# Patient Record
Sex: FEMALE | Employment: OTHER | URBAN - METROPOLITAN AREA
[De-identification: names, ages, dates, MRNs, and addresses within clinical notes are randomized per-mention and may not be internally consistent; named-entity substitution may affect disease eponyms.]

---

## 2019-03-07 ENCOUNTER — HOSPITAL ENCOUNTER (OUTPATIENT)
Age: 69
Setting detail: SPECIMEN
Discharge: HOME | End: 2019-03-07
Payer: MEDICARE

## 2019-03-07 LAB
ALBUMIN SERPL-MCNC: 4.5 GM/DL (ref 3.4–5)
ALP BLD-CCNC: 80 IU/L (ref 40–129)
ALT SERPL-CCNC: 20 U/L (ref 10–40)
ANION GAP SERPL CALCULATED.3IONS-SCNC: 13 MMOL/L (ref 4–16)
AST SERPL-CCNC: 19 IU/L (ref 15–37)
BILIRUB SERPL-MCNC: 0.4 MG/DL (ref 0–1)
BUN BLDV-MCNC: 16 MG/DL (ref 6–23)
CALCIUM SERPL-MCNC: 10 MG/DL (ref 8.3–10.6)
CHLORIDE BLD-SCNC: 98 MMOL/L (ref 99–110)
CO2: 30 MMOL/L (ref 21–32)
CREAT SERPL-MCNC: 0.7 MG/DL (ref 0.6–1.1)
FERRITIN: 35 NG/ML (ref 15–150)
FOLATE: >20 NG/ML (ref 3.1–17.5)
GFR AFRICAN AMERICAN: >60 ML/MIN/1.73M2
GFR NON-AFRICAN AMERICAN: >60 ML/MIN/1.73M2
GLUCOSE BLD-MCNC: 161 MG/DL (ref 70–99)
IRON: 80 UG/DL (ref 37–145)
PCT TRANSFERRIN: 21 % (ref 10–44)
POTASSIUM SERPL-SCNC: 4.5 MMOL/L (ref 3.5–5.1)
SODIUM BLD-SCNC: 141 MMOL/L (ref 135–145)
TOTAL IRON BINDING CAPACITY: 375 UG/DL (ref 250–450)
TOTAL PROTEIN: 6.8 GM/DL (ref 6.4–8.2)
UNSATURATED IRON BINDING CAPACITY: 295 UG/DL (ref 110–370)
VITAMIN B-12: 633.6 PG/ML (ref 211–911)

## 2019-03-07 PROCEDURE — 82728 ASSAY OF FERRITIN: CPT

## 2019-03-07 PROCEDURE — 83540 ASSAY OF IRON: CPT

## 2019-03-07 PROCEDURE — 80053 COMPREHEN METABOLIC PANEL: CPT

## 2019-03-07 PROCEDURE — 82607 VITAMIN B-12: CPT

## 2019-03-07 PROCEDURE — 83550 IRON BINDING TEST: CPT

## 2019-03-07 PROCEDURE — 82746 ASSAY OF FOLIC ACID SERUM: CPT

## 2019-08-01 ENCOUNTER — HOSPITAL ENCOUNTER (OUTPATIENT)
Age: 69
Setting detail: SPECIMEN
Discharge: HOME | End: 2019-08-01
Payer: MEDICARE

## 2019-08-01 LAB
ALBUMIN SERPL-MCNC: 4.3 GM/DL (ref 3.4–5)
ALP BLD-CCNC: 65 IU/L (ref 40–129)
ALT SERPL-CCNC: 18 U/L (ref 10–40)
ANION GAP SERPL CALCULATED.3IONS-SCNC: 15 MMOL/L (ref 4–16)
AST SERPL-CCNC: 14 IU/L (ref 15–37)
BILIRUB SERPL-MCNC: 0.2 MG/DL (ref 0–1)
BUN BLDV-MCNC: 18 MG/DL (ref 6–23)
CALCIUM SERPL-MCNC: 10 MG/DL (ref 8.3–10.6)
CHLORIDE BLD-SCNC: 99 MMOL/L (ref 99–110)
CO2: 25 MMOL/L (ref 21–32)
CREAT SERPL-MCNC: 0.6 MG/DL (ref 0.6–1.1)
FERRITIN: 30 NG/ML (ref 15–150)
GFR AFRICAN AMERICAN: >60 ML/MIN/1.73M2
GFR NON-AFRICAN AMERICAN: >60 ML/MIN/1.73M2
GLUCOSE BLD-MCNC: 171 MG/DL (ref 70–99)
IRON: 50 UG/DL (ref 37–145)
PCT TRANSFERRIN: 13 % (ref 10–44)
POTASSIUM SERPL-SCNC: 4.6 MMOL/L (ref 3.5–5.1)
SODIUM BLD-SCNC: 139 MMOL/L (ref 135–145)
TOTAL IRON BINDING CAPACITY: 393 UG/DL (ref 250–450)
TOTAL PROTEIN: 6.8 GM/DL (ref 6.4–8.2)
UNSATURATED IRON BINDING CAPACITY: 343 UG/DL (ref 110–370)

## 2019-08-01 PROCEDURE — 80053 COMPREHEN METABOLIC PANEL: CPT

## 2019-08-01 PROCEDURE — 83550 IRON BINDING TEST: CPT

## 2019-08-01 PROCEDURE — 83540 ASSAY OF IRON: CPT

## 2019-08-01 PROCEDURE — 82728 ASSAY OF FERRITIN: CPT

## 2020-02-10 ENCOUNTER — HOSPITAL ENCOUNTER (OUTPATIENT)
Dept: INFUSION THERAPY | Age: 70
Discharge: HOME | End: 2020-02-10
Payer: MEDICARE

## 2020-02-10 LAB
BACTERIA: NEGATIVE /HPF
BILIRUBIN URINE: NEGATIVE MG/DL
BLOOD, URINE: NEGATIVE
CALCIUM OXALATE CRYSTALS: ABNORMAL /HPF
CLARITY: ABNORMAL
COLOR: YELLOW
GLUCOSE, URINE: >500 MG/DL
HYALINE CASTS: 0 /LPF
KETONES, URINE: ABNORMAL MG/DL
LEUKOCYTE ESTERASE, URINE: NEGATIVE
MUCUS: ABNORMAL HPF
NITRITE URINE, QUANTITATIVE: NEGATIVE
PH, URINE: 5 (ref 5–8)
PROTEIN UA: NEGATIVE MG/DL
RBC URINE: 1 /HPF (ref 0–6)
SPECIFIC GRAVITY UA: 1.02 (ref 1–1.03)
SQUAMOUS EPITHELIAL: <1 /HPF
TRICHOMONAS: ABNORMAL /HPF
UROBILINOGEN, URINE: NORMAL MG/DL (ref 0.2–1)
WBC UA: <1 /HPF (ref 0–5)

## 2020-02-10 PROCEDURE — 81001 URINALYSIS AUTO W/SCOPE: CPT

## 2020-02-25 ENCOUNTER — TELEPHONE (OUTPATIENT)
Dept: INFUSION THERAPY | Age: 70
End: 2020-02-25

## 2020-02-27 ENCOUNTER — HOSPITAL ENCOUNTER (OUTPATIENT)
Dept: INFUSION THERAPY | Age: 70
Setting detail: INFUSION SERIES
Discharge: HOME | End: 2020-02-27
Payer: MEDICARE

## 2020-02-27 VITALS
HEART RATE: 83 BPM | RESPIRATION RATE: 16 BRPM | DIASTOLIC BLOOD PRESSURE: 69 MMHG | OXYGEN SATURATION: 95 % | TEMPERATURE: 97.9 F | SYSTOLIC BLOOD PRESSURE: 132 MMHG

## 2020-02-27 PROCEDURE — 2580000003 HC RX 258: Performed by: INTERNAL MEDICINE

## 2020-02-27 PROCEDURE — 96365 THER/PROPH/DIAG IV INF INIT: CPT

## 2020-02-27 PROCEDURE — 6360000002 HC RX W HCPCS: Performed by: INTERNAL MEDICINE

## 2020-02-27 PROCEDURE — 99211 OFF/OP EST MAY X REQ PHY/QHP: CPT

## 2020-02-27 RX ORDER — FERROUS SULFATE 325(65) MG
325 TABLET ORAL
COMMUNITY

## 2020-02-27 RX ORDER — ASCORBIC ACID 500 MG
500 TABLET ORAL DAILY
COMMUNITY

## 2020-02-27 RX ORDER — METFORMIN HYDROCHLORIDE 500 MG/1
500 TABLET, EXTENDED RELEASE ORAL
COMMUNITY

## 2020-02-27 RX ORDER — ATORVASTATIN CALCIUM 10 MG/1
10 TABLET, FILM COATED ORAL NIGHTLY
COMMUNITY

## 2020-02-27 RX ORDER — 0.9 % SODIUM CHLORIDE 0.9 %
10 VIAL (ML) INJECTION PRN
Status: DISCONTINUED | OUTPATIENT
Start: 2020-02-27 | End: 2020-02-28 | Stop reason: HOSPADM

## 2020-02-27 RX ORDER — ACETAMINOPHEN 325 MG/1
325 TABLET ORAL NIGHTLY
COMMUNITY

## 2020-02-27 RX ORDER — ESCITALOPRAM OXALATE 10 MG/1
10 TABLET ORAL EVERY 12 HOURS
COMMUNITY

## 2020-02-27 RX ADMIN — FERRIC CARBOXYMALTOSE INJECTION 750 MG: 50 INJECTION, SOLUTION INTRAVENOUS at 13:45

## 2020-03-06 ENCOUNTER — HOSPITAL ENCOUNTER (OUTPATIENT)
Dept: INFUSION THERAPY | Age: 70
Setting detail: INFUSION SERIES
Discharge: HOME | End: 2020-03-06
Payer: MEDICARE

## 2020-03-06 VITALS
OXYGEN SATURATION: 94 % | TEMPERATURE: 98.2 F | HEART RATE: 76 BPM | RESPIRATION RATE: 16 BRPM | DIASTOLIC BLOOD PRESSURE: 60 MMHG | SYSTOLIC BLOOD PRESSURE: 115 MMHG

## 2020-03-06 PROCEDURE — 6360000002 HC RX W HCPCS: Performed by: INTERNAL MEDICINE

## 2020-03-06 PROCEDURE — 99211 OFF/OP EST MAY X REQ PHY/QHP: CPT

## 2020-03-06 PROCEDURE — 2580000003 HC RX 258: Performed by: INTERNAL MEDICINE

## 2020-03-06 PROCEDURE — 96365 THER/PROPH/DIAG IV INF INIT: CPT

## 2020-03-06 RX ORDER — SODIUM CHLORIDE 0.9 % (FLUSH) 0.9 %
10 SYRINGE (ML) INJECTION PRN
Status: DISCONTINUED | OUTPATIENT
Start: 2020-03-06 | End: 2020-03-07 | Stop reason: HOSPADM

## 2020-03-06 RX ADMIN — FERRIC CARBOXYMALTOSE INJECTION 750 MG: 50 INJECTION, SOLUTION INTRAVENOUS at 13:45

## 2020-03-06 RX ADMIN — SODIUM CHLORIDE, PRESERVATIVE FREE 10 ML: 5 INJECTION INTRAVENOUS at 14:20

## 2020-03-06 RX ADMIN — SODIUM CHLORIDE, PRESERVATIVE FREE 10 ML: 5 INJECTION INTRAVENOUS at 13:45

## 2020-03-06 SDOH — HEALTH STABILITY: MENTAL HEALTH: HOW OFTEN DO YOU HAVE A DRINK CONTAINING ALCOHOL?: NEVER

## 2020-03-06 NOTE — DISCHARGE SUMMARY
Tolerated infusion well. Reviewed discharge instructions, understanding verbalized. Copies of AVS given to take home. Patient discharged home. Down to exit per self.     Orders Placed This Encounter   Medications    DISCONTD: ferric carboxymaltose (INJECTAFER) IV solution 750 mg    sodium chloride flush 0.9 % injection 10 mL    ferric carboxymaltose (INJECTAFER) 750 mg in sodium chloride 0.9 % 250 mL IVPB

## 2020-03-06 NOTE — DISCHARGE SUMMARY
Ambulatory to unit room 4 for Injectafer. Reorientated to unit. Procedure and plan of care explained. Questions answered. Understanding verbalized.

## 2020-04-28 PROBLEM — D50.0 IRON DEFICIENCY ANEMIA SECONDARY TO BLOOD LOSS (CHRONIC): Status: ACTIVE | Noted: 2020-04-28

## 2020-04-28 PROBLEM — D75.9 DISEASE OF BLOOD AND BLOOD-FORMING ORGANS, UNSPECIFIED: Status: ACTIVE | Noted: 2020-04-28

## 2020-04-28 PROBLEM — K90.9 INTESTINAL MALABSORPTION, UNSPECIFIED: Status: ACTIVE | Noted: 2020-04-28

## 2020-06-25 ENCOUNTER — HOSPITAL ENCOUNTER (OUTPATIENT)
Dept: INFUSION THERAPY | Age: 70
Discharge: HOME | End: 2020-06-25
Payer: MEDICARE

## 2020-06-25 PROCEDURE — 99211 OFF/OP EST MAY X REQ PHY/QHP: CPT

## 2020-09-30 ENCOUNTER — OFFICE VISIT (OUTPATIENT)
Dept: ONCOLOGY | Age: 70
End: 2020-09-30
Payer: MEDICARE

## 2020-09-30 ENCOUNTER — HOSPITAL ENCOUNTER (OUTPATIENT)
Dept: INFUSION THERAPY | Age: 70
Discharge: HOME | End: 2020-09-30
Payer: MEDICARE

## 2020-09-30 VITALS
OXYGEN SATURATION: 94 % | WEIGHT: 142.2 LBS | TEMPERATURE: 98 F | DIASTOLIC BLOOD PRESSURE: 73 MMHG | HEIGHT: 62 IN | SYSTOLIC BLOOD PRESSURE: 122 MMHG | HEART RATE: 112 BPM | RESPIRATION RATE: 16 BRPM | BODY MASS INDEX: 26.17 KG/M2

## 2020-09-30 PROCEDURE — 99211 OFF/OP EST MAY X REQ PHY/QHP: CPT

## 2020-09-30 PROCEDURE — 99204 OFFICE O/P NEW MOD 45 MIN: CPT | Performed by: INTERNAL MEDICINE

## 2020-09-30 NOTE — PROGRESS NOTES
potassium of 4 BUN of 15 creatinine 0.58 LFTs within normal limits CBC with WBC of 8 hemoglobin of 15.2 hematocrit 45.7 MCV of 96 and platelets of 336     6/18/20: CBC with wbc 5.7, hb 12.4, hct 36.8, mcv 92, platelets 582  G24 430, Folate >20 ferritin 61  Calcium 10.3    9/21/2020 CBC with WBC 5.8 hemoglobin of 11.2 hematocrit of 32.6 MCV of 88 platelets of 757 calcium of 10.4 iron saturation 1 in the 96% ferritin of 13    PMH: DM, HLP, GERD, colonoscopy in 2017, EGD 2017(apparently normal per Pt)    PSH: Tonsillectomy, D&C, cholecystectomy, bilateral cataract surgery in 2016, laser surgery bilateral eyes in 2017    Allergies:Celebrex: Stomach pain, Keflex sores in the mouth. bactrim stomach pain, prednisone BG elevation    FH:No family h/o leukema, lymphoma, anemia or any other blood dyscrasias    SH:Lives alone. Has two children. No tob, etoh or any other illicit drug abuse    Interval History  9/30/2020: Arrived alone to the clinic today. Reported halitosis with oral iron tablets and hence stopped taking them. Reported fatigue which has been chronic. Denied any overt bleeding. Reported anal itching, applying cream recommended by GI. No constipation or any diarrhea. No weight loss. No abdominal pain,nausea or any emesis. No chest pain, increased sob, palpitations or nay dizziness. Started injections rt eye as it was swollen.       Review of Systems   Per interval history; otherwise 10 point ROS is negative              Vital Signs:  /73 (Site: Right Upper Arm, Position: Sitting, Cuff Size: Medium Adult)   Pulse 112   Temp 98 °F (36.7 °C) (Infrared)   Resp 16   Ht 5' 1.5\" (1.562 m)   Wt 142 lb 3.2 oz (64.5 kg)   SpO2 94%   BMI 26.43 kg/m²     Physical Exam:  CONSTITUTIONAL: awake, alert, well built   EYES: No palor   ENT: ATNC   NECK: No JVD   HEMATOLOGIC/LYMPHATIC: no cervical, supraclavicular or axillary lymphadenopathy   LUNGS: ctab   CARDIOVASCULAR: s1s2 , rrr, no murmurs   ABDOMEN: soft ntnd bs pos  NEUROLOGIC: GI   SKIN: echymosis   EXTREMITIES: No LE edema      Labs:    Hematology:  Lab Results   Component Value Date    WBC 5.8 09/21/2020    RBC 3.93 09/21/2020    HGB 11.2 09/21/2020    HCT 34.6 09/21/2020    MCV 88 09/21/2020    MCH 28.5 09/21/2020    MCHC 32.4 09/21/2020    RDW 12.7 09/21/2020     09/21/2020    SEGSPCT 64 09/21/2020    EOSRELPCT 3 09/21/2020    BASOPCT 1 09/21/2020    LYMPHOPCT 23 09/21/2020    MONOPCT 9 09/21/2020    EOSABS 0.2 09/21/2020    BASOSABS 0.1 09/21/2020    LYMPHSABS 1.3 09/21/2020    MONOSABS 0.5 09/21/2020     No results found for: ESR    Chemistry:  Lab Results   Component Value Date     09/21/2020    K 5.0 09/21/2020     09/21/2020    CO2 25 09/21/2020    BUN 13 09/21/2020    CREATININE 0.71 09/21/2020    GLUCOSE 69 09/21/2020    CALCIUM 10.4 (H) 09/21/2020    PROT 6.8 09/21/2020    LABALBU 4.4 09/21/2020    BILITOT 0.2 09/21/2020    ALKPHOS 73 09/21/2020    AST 21 09/21/2020    ALT 20 09/21/2020    LABGLOM 87 09/21/2020    GFRAA 100 09/21/2020    AGRATIO 1.8 09/21/2020    GLOB 2.4 09/21/2020     No results found for: MMA, LDH, HOMOCYSTEINE  No components found for: LD  No results found for: TSHHS, T4FREE, FT3    Immunology:  Lab Results   Component Value Date    PROT 6.8 09/21/2020     No results found for: Casandra Stagger, KLFLCR  No results found for: B2M    Coagulation Panel:  No results found for: PROTIME, INR, APTT, DDIMER    Anemia Panel:  Lab Results   Component Value Date    POXBIADW34 633.6 03/07/2019    FOLATE >20.0 (H) 03/07/2019       Tumor Markers:  No results found for: , CEA, , LABCA2, PSA      Imaging: Reviewed     Pathology:Reviewed     Observations:  Performance Status: ECOG 0  Depression Status: No data recorded        Assessment & Plan:  hypoferritinemia without significant anemia: ?etiology. But note iron sats are high(do not correlate). UA with no blood. No overt bleeding.  Received parental iron and is on oral iron.constipation. GI w/u 2017 benign. D/C iron and repeat iron panel in 6-8 weeks. very mildly elevated calcium: D/C calcium supplements    Continue other medical care. Discussed above findings and plan with her and she verbalized understanding. Answered all her questions. Discussed healthy lifestyle including regular exercise and healthy diet. Also discussed importance of being up-to-date with age-appropriate screening tools. Mammogram due this fall. No more pap smears but pelvic exam every two years, last exam was in 2019. Recommend follow-up with primary care physician and other specialist.    Please do not hesitate to contact us if you have any questions. Return to clinic in jan 2021  or earlier if new symptoms. I have recommended that the patient follow CDC guidelines for prevention of COVID-19 infection.     [id number]           Electronically signed by Chino Brambila MD on 9/30/2020 at 2:08 PM

## 2020-09-30 NOTE — PROGRESS NOTES
MA Rooming Questions  Patient: Kim Walden  MRN: [id number]    Date: 9/30/2020        1. Do you have any new issues? yes - Bad breath, itching in rectum after BM         2. Do you need any refills on medications?    no    3. Have you had any imaging done since your last visit?   no    4. Have you been hospitalized or seen in the emergency room since your last visit here?   no    5. Did the patient have a depression screening completed today?  No    No data recorded     PHQ-9 Given to (if applicable):               PHQ-9 Score (if applicable):                     [] Positive     []  Negative              Does question #9 need addressed (if applicable)                     [] Yes    []  No               Rosario Saldana MA

## 2020-10-28 ENCOUNTER — TELEPHONE (OUTPATIENT)
Dept: ONCOLOGY | Age: 70
End: 2020-10-28

## 2020-10-28 NOTE — TELEPHONE ENCOUNTER
Returned call to pt. She stated that she feels fatigued and weak. She thinks her iron is low and may need an iron infusion. She had a sinus infection followed by a UTI a few weeks ago but just can't seem to get any energy. Pt scheduled for labs this week.

## 2020-10-28 NOTE — TELEPHONE ENCOUNTER
Patient states she has had a sinus for several weeks. States she has been on two antibiotics. Just feels like she is \"boncing back\" from the sinus infection as soon as usual.  Would like to know if she should come in and get lab work to check her iron levels.

## 2020-10-30 ENCOUNTER — HOSPITAL ENCOUNTER (OUTPATIENT)
Dept: INFUSION THERAPY | Age: 70
Discharge: HOME | End: 2020-10-30
Payer: MEDICARE

## 2020-10-30 DIAGNOSIS — D50.0 IRON DEFICIENCY ANEMIA SECONDARY TO BLOOD LOSS (CHRONIC): ICD-10-CM

## 2020-10-30 LAB
IRON: 13 UG/DL (ref 37–145)
PCT TRANSFERRIN: 3 % (ref 10–44)
TOTAL IRON BINDING CAPACITY: 400 UG/DL (ref 250–450)
UNSATURATED IRON BINDING CAPACITY: 387 UG/DL (ref 110–370)

## 2020-10-30 PROCEDURE — 36415 COLL VENOUS BLD VENIPUNCTURE: CPT

## 2020-10-30 PROCEDURE — 83540 ASSAY OF IRON: CPT

## 2020-10-30 PROCEDURE — 83550 IRON BINDING TEST: CPT

## 2020-10-30 PROCEDURE — 82728 ASSAY OF FERRITIN: CPT

## 2020-11-01 LAB — FERRITIN: 6 NG/ML (ref 15–150)

## 2020-11-03 RX ORDER — SODIUM CHLORIDE 0.9 % (FLUSH) 0.9 %
5 SYRINGE (ML) INJECTION PRN
Status: CANCELLED | OUTPATIENT
Start: 2020-11-10

## 2020-11-03 RX ORDER — DIPHENHYDRAMINE HYDROCHLORIDE 50 MG/ML
50 INJECTION INTRAMUSCULAR; INTRAVENOUS ONCE
Status: CANCELLED | OUTPATIENT
Start: 2020-11-10

## 2020-11-03 RX ORDER — SODIUM CHLORIDE 0.9 % (FLUSH) 0.9 %
10 SYRINGE (ML) INJECTION PRN
Status: CANCELLED | OUTPATIENT
Start: 2020-11-10

## 2020-11-03 RX ORDER — SODIUM CHLORIDE 9 MG/ML
INJECTION, SOLUTION INTRAVENOUS CONTINUOUS
Status: CANCELLED | OUTPATIENT
Start: 2020-11-10

## 2020-11-03 RX ORDER — HEPARIN SODIUM (PORCINE) LOCK FLUSH IV SOLN 100 UNIT/ML 100 UNIT/ML
500 SOLUTION INTRAVENOUS PRN
Status: CANCELLED | OUTPATIENT
Start: 2020-11-10

## 2020-11-03 RX ORDER — EPINEPHRINE 1 MG/ML
0.3 INJECTION, SOLUTION, CONCENTRATE INTRAVENOUS PRN
Status: CANCELLED | OUTPATIENT
Start: 2020-11-10

## 2020-11-03 RX ORDER — METHYLPREDNISOLONE SODIUM SUCCINATE 125 MG/2ML
125 INJECTION, POWDER, LYOPHILIZED, FOR SOLUTION INTRAMUSCULAR; INTRAVENOUS ONCE
Status: CANCELLED | OUTPATIENT
Start: 2020-11-10

## 2020-11-04 ENCOUNTER — TELEPHONE (OUTPATIENT)
Dept: ONCOLOGY | Age: 70
End: 2020-11-04

## 2020-11-04 NOTE — TELEPHONE ENCOUNTER
Per Dr Becca Kruse, spoke with pt regarding the need for an iron infusion and f/u with GI. Pt expressed understanding and will call Dr Yelitza Jones to schedule a colonoscopy. She is seeing her PCP today and will speak with them about doing a UA to rule out sources of blood loss.

## 2020-11-19 ENCOUNTER — HOSPITAL ENCOUNTER (OUTPATIENT)
Dept: SURGERY | Age: 70
Discharge: HOME | End: 2020-11-19
Payer: MEDICARE

## 2020-11-19 VITALS
HEART RATE: 82 BPM | TEMPERATURE: 98.5 F | OXYGEN SATURATION: 95 % | DIASTOLIC BLOOD PRESSURE: 55 MMHG | RESPIRATION RATE: 16 BRPM | SYSTOLIC BLOOD PRESSURE: 125 MMHG

## 2020-11-19 PROCEDURE — 2580000003 HC RX 258: Performed by: INTERNAL MEDICINE

## 2020-11-19 PROCEDURE — 6360000002 HC RX W HCPCS: Performed by: INTERNAL MEDICINE

## 2020-11-19 PROCEDURE — 96366 THER/PROPH/DIAG IV INF ADDON: CPT

## 2020-11-19 PROCEDURE — 96365 THER/PROPH/DIAG IV INF INIT: CPT

## 2020-11-19 RX ORDER — SODIUM CHLORIDE 0.9 % (FLUSH) 0.9 %
10 SYRINGE (ML) INJECTION PRN
Status: DISCONTINUED | OUTPATIENT
Start: 2020-11-19 | End: 2020-11-20 | Stop reason: HOSPADM

## 2020-11-19 RX ORDER — HEPARIN SODIUM (PORCINE) LOCK FLUSH IV SOLN 100 UNIT/ML 100 UNIT/ML
500 SOLUTION INTRAVENOUS PRN
Status: CANCELLED | OUTPATIENT
Start: 2020-11-26

## 2020-11-19 RX ORDER — METHYLPREDNISOLONE SODIUM SUCCINATE 125 MG/2ML
125 INJECTION, POWDER, LYOPHILIZED, FOR SOLUTION INTRAMUSCULAR; INTRAVENOUS ONCE
Status: CANCELLED | OUTPATIENT
Start: 2020-11-26

## 2020-11-19 RX ORDER — DIPHENHYDRAMINE HYDROCHLORIDE 50 MG/ML
50 INJECTION INTRAMUSCULAR; INTRAVENOUS ONCE
Status: CANCELLED | OUTPATIENT
Start: 2020-11-26

## 2020-11-19 RX ORDER — EPINEPHRINE 1 MG/ML
0.3 INJECTION, SOLUTION, CONCENTRATE INTRAVENOUS PRN
Status: CANCELLED | OUTPATIENT
Start: 2020-11-26

## 2020-11-19 RX ORDER — SODIUM CHLORIDE 0.9 % (FLUSH) 0.9 %
5 SYRINGE (ML) INJECTION PRN
Status: CANCELLED | OUTPATIENT
Start: 2020-11-26

## 2020-11-19 RX ORDER — SODIUM CHLORIDE 0.9 % (FLUSH) 0.9 %
10 SYRINGE (ML) INJECTION PRN
Status: CANCELLED | OUTPATIENT
Start: 2020-11-26

## 2020-11-19 RX ORDER — SODIUM CHLORIDE 9 MG/ML
INJECTION, SOLUTION INTRAVENOUS CONTINUOUS
Status: ACTIVE | OUTPATIENT
Start: 2020-11-19 | End: 2020-11-19

## 2020-11-19 RX ORDER — SODIUM CHLORIDE 9 MG/ML
INJECTION, SOLUTION INTRAVENOUS CONTINUOUS
Status: CANCELLED | OUTPATIENT
Start: 2020-11-26

## 2020-11-19 RX ADMIN — FERRIC CARBOXYMALTOSE INJECTION 750 MG: 50 INJECTION, SOLUTION INTRAVENOUS at 11:21

## 2020-11-19 RX ADMIN — SODIUM CHLORIDE: 9 INJECTION, SOLUTION INTRAVENOUS at 11:21

## 2020-11-19 RX ADMIN — SODIUM CHLORIDE, PRESERVATIVE FREE 10 ML: 5 INJECTION INTRAVENOUS at 11:15

## 2020-11-24 ENCOUNTER — TELEPHONE (OUTPATIENT)
Dept: ONCOLOGY | Age: 70
End: 2020-11-24

## 2020-11-24 NOTE — TELEPHONE ENCOUNTER
Called Dr. Lennox Latch office and  answered and stated they are limiting hrs and open Mon 8-4:30and Tues - Fri 8-3.  does not accept messages, will continue to call back office.  Rip Councilman

## 2021-01-08 ENCOUNTER — HOSPITAL ENCOUNTER (OUTPATIENT)
Dept: INFUSION THERAPY | Age: 71
Discharge: HOME | End: 2021-01-08
Payer: MEDICARE

## 2021-01-08 ENCOUNTER — OFFICE VISIT (OUTPATIENT)
Dept: ONCOLOGY | Age: 71
End: 2021-01-08
Payer: MEDICARE

## 2021-01-08 VITALS
HEART RATE: 73 BPM | DIASTOLIC BLOOD PRESSURE: 67 MMHG | BODY MASS INDEX: 26.3 KG/M2 | TEMPERATURE: 97.5 F | OXYGEN SATURATION: 97 % | WEIGHT: 141.5 LBS | SYSTOLIC BLOOD PRESSURE: 133 MMHG

## 2021-01-08 DIAGNOSIS — K90.9 INTESTINAL MALABSORPTION, UNSPECIFIED TYPE: Primary | ICD-10-CM

## 2021-01-08 DIAGNOSIS — D50.0 IRON DEFICIENCY ANEMIA SECONDARY TO BLOOD LOSS (CHRONIC): ICD-10-CM

## 2021-01-08 PROCEDURE — 99211 OFF/OP EST MAY X REQ PHY/QHP: CPT

## 2021-01-08 PROCEDURE — 99214 OFFICE O/P EST MOD 30 MIN: CPT | Performed by: INTERNAL MEDICINE

## 2021-01-08 RX ORDER — LEVOFLOXACIN 750 MG/1
TABLET ORAL
COMMUNITY
Start: 2020-10-22

## 2021-01-08 RX ORDER — FERROUS GLUCONATE 324(37.5)
324 TABLET ORAL 2 TIMES DAILY
Qty: 60 TABLET | Refills: 3 | Status: SHIPPED | OUTPATIENT
Start: 2021-01-08 | End: 2021-05-03

## 2021-01-08 ASSESSMENT — PATIENT HEALTH QUESTIONNAIRE - PHQ9
SUM OF ALL RESPONSES TO PHQ9 QUESTIONS 1 & 2: 1
SUM OF ALL RESPONSES TO PHQ QUESTIONS 1-9: 1
SUM OF ALL RESPONSES TO PHQ QUESTIONS 1-9: 1
1. LITTLE INTEREST OR PLEASURE IN DOING THINGS: 0

## 2021-01-08 NOTE — PROGRESS NOTES
Patient Name: [place]  Patient : 1950  Patient MRN: [id number]     Primary Oncologist: Kenroy Arredondo MD  Referring Physician: Shanelle Argueta       Date of Service: 2021      Chief Complaint:   Chief Complaint   Patient presents with    Follow-up        Active Problem list  1. Intestinal malabsorption, unspecified type    2. Iron deficiency anemia secondary to blood loss (chronic)           HPI:        77-year-old female here for evaluation on 2017 white count 6.2 hemoglobin 9.1 MCV 71 platelets 526 cyst of the differential looks good. Serum iron 46, ferritin 5 B12 704. On 2017 white count 6 hemoglobin 8.6 MCV 73 platelets 118. Her serum iron at that time was 32 B12 663 ferritin 6. She had iron studies also in the beginning of 2017 which were similar to the ones above. She had TSH that was 1.4. Looking back on her chart in 2016 hemoglobin was normal MCV was normal platelets was normal.Looking through the chart by from Dr. Mitzi Deluca Patient is on iron 180 daily. She has been having some fatigue some dark stools. She had a EGD 2017 which showed moderate gastritis and GERD. And she also had a colonoscopy in 2017, which is normal, Dr. Jerzy Arcos. she has been on iron since 2017. No PICA sx. Increasing leg cramps at night. Has some constipation on iron. Taking iron tablets w/o difficulty. 2-18 WBC 7, hgb 12. 2. platlets 353 MCV 77, serum iron 242, ferritin 16,   7/10/2018 white count 5 hemoglobin 14 platelet count 895. LFTs normal.TIBC 4 5 iron 86 iron sat 21 serum ferritin 30  1-19 8.2> 12.5< 458 iron 52, ferritin 16  2020 CMP with sodium of 140 potassium 4 BUN of 18 creatinine 0.61 calcium 10.3 glucose of 210 rest of LFTs within normal limits ferritin 20.9 TIBC 397.  CBC with WBC of 7.6 hemoglobin 12.7 hematocrit 38.4 MCV 95.2 platelets of 420     ferritin of 447 S87 of 471 folic acid of more than 20 CMP with sodium of 138 potassium of 4 BUN of 15 creatinine 0.58 LFTs within normal limits CBC with WBC of 8 hemoglobin of 15.2 hematocrit 45.7 MCV of 96 and platelets of 919     6/18/20: CBC with wbc 5.7, hb 12.4, hct 36.8, mcv 92, platelets 486  O54 350, Folate >20 ferritin 61  Calcium 10.3    9/21/2020 CBC with WBC 5.8 hemoglobin of 11.2 hematocrit of 32.6 MCV of 88 platelets of 388 calcium of 10.4 iron saturation 1 in the 96% ferritin of 13 December 2020 colonoscopy normal.      PMH: DM, HLP, GERD, colonoscopy in 2017, EGD 2017(apparently normal per Pt)    PSH: Tonsillectomy, D&C, cholecystectomy, bilateral cataract surgery in 2016, laser surgery bilateral eyes in 2017    Allergies:Celebrex: Stomach pain, Keflex sores in the mouth. bactrim stomach pain, prednisone BG elevation    FH:No family h/o leukema, lymphoma, anemia or any other blood dyscrasias    SH:Lives alone. Has two children. No tob, etoh or any other illicit drug abuse    Interval History  1/8/2021: Arrived with her  to the clinic today. Tired all the time. No PICAsx. No chest pain, increased sob. No bleeding or nay rash. Reported halitosis with oral iron tablets and hence stopped taking them. No constipation or any diarrhea. No weight loss. No abdominal pain,nausea or any emesis.  No  palpitations or any dizziness      Review of Systems   Per interval history; otherwise 10 point ROS is negative              Vital Signs:  /67 (Site: Right Upper Arm, Position: Sitting, Cuff Size: Medium Adult)   Pulse 73   Temp 97.5 °F (36.4 °C) (Infrared)   Wt 141 lb 8 oz (64.2 kg)   SpO2 97%   BMI 26.30 kg/m²     Physical Exam:  CONSTITUTIONAL: awake, alert, well built   EYES: No palor   ENT: ATNC   NECK: No JVD   HEMATOLOGIC/LYMPHATIC: no cervical, supraclavicular or axillary lymphadenopathy   LUNGS: ctab   CARDIOVASCULAR: s1s2 , rrr, no murmurs   ABDOMEN: soft ntnd bs pos  NEUROLOGIC: GI   SKIN: echymosis   EXTREMITIES: No LE edema bilaterally      Labs: Hematology:  Lab Results   Component Value Date    WBC 7.2 12/30/2020    RBC 3.80 12/30/2020    HGB 10.5 (L) 12/30/2020    HCT 33.5 (L) 12/30/2020    MCV 88 12/30/2020    MCH 27.6 12/30/2020    MCHC 31.3 (L) 12/30/2020    RDW 17.5 (H) 12/30/2020     (H) 12/30/2020    SEGSPCT 73 12/30/2020    EOSRELPCT 4 12/30/2020    BASOPCT 1 12/30/2020    LYMPHOPCT 15 12/30/2020    MONOPCT 7 12/30/2020    EOSABS 0.3 12/30/2020    BASOSABS 0.1 12/30/2020    LYMPHSABS 1.1 12/30/2020    MONOSABS 0.5 12/30/2020     No results found for: ESR    Chemistry:  Lab Results   Component Value Date     12/30/2020    K 4.6 12/30/2020     12/30/2020    CO2 23 12/30/2020    BUN 16 12/30/2020    CREATININE 0.64 12/30/2020    GLUCOSE 198 (H) 12/30/2020    CALCIUM 9.7 12/30/2020    PROT 6.6 12/30/2020    LABALBU 4.2 12/30/2020    BILITOT 0.2 12/30/2020    ALKPHOS 83 12/30/2020    AST 24 12/30/2020    ALT 22 12/30/2020    LABGLOM 91 12/30/2020    GFRAA 105 12/30/2020    AGRATIO 1.8 12/30/2020    GLOB 2.4 12/30/2020     No results found for: MMA, LDH, HOMOCYSTEINE  No components found for: LD  No results found for: TSHHS, T4FREE, FT3    Immunology:  Lab Results   Component Value Date    PROT 6.6 12/30/2020     No results found for: Seleta Goes, KLFLCR  No results found for: B2M    Coagulation Panel:  No results found for: PROTIME, INR, APTT, DDIMER    Anemia Panel:  Lab Results   Component Value Date    AOKCKPLH38 099 12/30/2020    FOLATE >20.0 12/30/2020       Tumor Markers:  No results found for: , CEA, , LABCA2, PSA      Imaging: Reviewed     Pathology:Reviewed     Observations:  Performance Status: ECOG 0  Depression Status: PHQ-9 Total Score: 1 (1/8/2021 11:29 AM)          Assessment & Plan:  Anemia; NC: iron panel and ferritin consistent with WILL. UA with no blood. No overt bleeding. Received parental iron. GI w/u 2017 benign, repeat colonoscopy in December 2020 normal. May need EGD and VCE? Melani Lee resume oral iron, ferrous gluconate this time with vitamin C/orange juice. Further evaluation including BMB/Aspiration and other w/u if anemia persistent even after adequate iron replacement. Thrombocytosis:Most probably sec to WILL. Will monitor for now and as above    Halitosis: Recommend brushing teeth three times a day and mouth wash. Continue other medical care. Discussed above findings and plan with her and she verbalized understanding. Answered all her questions. Discussed healthy lifestyle including regular exercise and healthy diet. Also discussed importance of being up-to-date with age-appropriate screening tools. Mammogram due this fall. No more pap smears but pelvic exam every two years, last exam was in 2019. Recommend follow-up with primary care physician and other specialist.    Please do not hesitate to contact us if you have any questions. Return to clinic march 2021 or earlier if new symptoms. I have recommended that the patient follow CDC guidelines for prevention of COVID-19 infection.     Jaiden Thompson           Electronically signed by Isabell Rubio MD on 1/8/2021 at 11:45 AM

## 2021-01-08 NOTE — PROGRESS NOTES
MA Rooming Questions  Patient: Juanpablo Matthew  MRN: [id number]    Date: 1/8/2021        1. Do you have any new issues?   no         2. Do you need any refills on medications?    no    3. Have you had any imaging done since your last visit? yes - labs 12/31, colonoscopy 12/2      4. Have you been hospitalized or seen in the emergency room since your last visit here?   yes - October     5. Did the patient have a depression screening completed today?  Yes    PHQ-9 Total Score: 1 (1/8/2021 11:29 AM)       PHQ-9 Given to (if applicable):               PHQ-9 Score (if applicable):                     [] Positive     []  Negative              Does question #9 need addressed (if applicable)                     [] Yes    []  No               Linden Cogan, MA

## 2021-01-21 ENCOUNTER — TELEPHONE (OUTPATIENT)
Dept: ONCOLOGY | Age: 71
End: 2021-01-21

## 2021-01-21 NOTE — TELEPHONE ENCOUNTER
Patient would like a call back to review how she should take her iron pills. States Dr. Xi Eastman told her to take it with food or orange juice and the bottle states on an empty stomach. Also wants to know if she can take it with orange  juice at night or if that will increase her blood sugar.

## 2021-01-22 NOTE — TELEPHONE ENCOUNTER
Returned call to pt to clarify how to take iron supplement. Instructed pt to take the iron with Vitamin C. Pt expressed understanding.

## 2021-03-12 ENCOUNTER — OFFICE VISIT (OUTPATIENT)
Dept: ONCOLOGY | Age: 71
End: 2021-03-12
Payer: MEDICARE

## 2021-03-12 ENCOUNTER — HOSPITAL ENCOUNTER (OUTPATIENT)
Dept: INFUSION THERAPY | Age: 71
Discharge: HOME | End: 2021-03-12
Payer: MEDICARE

## 2021-03-12 VITALS
HEIGHT: 61 IN | OXYGEN SATURATION: 93 % | DIASTOLIC BLOOD PRESSURE: 59 MMHG | BODY MASS INDEX: 27.19 KG/M2 | SYSTOLIC BLOOD PRESSURE: 126 MMHG | WEIGHT: 144 LBS | TEMPERATURE: 98.7 F | HEART RATE: 88 BPM | RESPIRATION RATE: 16 BRPM

## 2021-03-12 DIAGNOSIS — K90.9 INTESTINAL MALABSORPTION, UNSPECIFIED TYPE: ICD-10-CM

## 2021-03-12 DIAGNOSIS — D50.0 IRON DEFICIENCY ANEMIA SECONDARY TO BLOOD LOSS (CHRONIC): Primary | ICD-10-CM

## 2021-03-12 PROCEDURE — 99213 OFFICE O/P EST LOW 20 MIN: CPT | Performed by: INTERNAL MEDICINE

## 2021-03-12 PROCEDURE — 99211 OFF/OP EST MAY X REQ PHY/QHP: CPT

## 2021-03-12 NOTE — PROGRESS NOTES
Patient Name: [place]  Patient : 1950  Patient MRN: [id number]     Primary Oncologist: Anne Marie Goel MD  Referring Physician: Regan Hastings       Date of Service: 3/12/2021      Chief Complaint:   Chief Complaint   Patient presents with    Follow-up    Results        Active Problem list  1. Iron deficiency anemia secondary to blood loss (chronic)    2. Intestinal malabsorption, unspecified type           HPI:        71-year-old female here for evaluation on 2017 white count 6.2 hemoglobin 9.1 MCV 71 platelets 712 cyst of the differential looks good. Serum iron 46, ferritin 5 B12 704. On 2017 white count 6 hemoglobin 8.6 MCV 73 platelets 693. Her serum iron at that time was 32 B12 663 ferritin 6. She had iron studies also in the beginning of 2017 which were similar to the ones above. She had TSH that was 1.4. Looking back on her chart in 2016 hemoglobin was normal MCV was normal platelets was normal.Looking through the chart by from Dr. Barrington Rivas Patient is on iron 180 daily. She has been having some fatigue some dark stools. She had a EGD 2017 which showed moderate gastritis and GERD. And she also had a colonoscopy in 2017, which is normal, Dr. Sloan Maya. she has been on iron since 2017. No PICA sx. Increasing leg cramps at night. Has some constipation on iron. Taking iron tablets w/o difficulty. 2-18 WBC 7, hgb 12. 2. platlets 353 MCV 77, serum iron 242, ferritin 16,   7/10/2018 white count 5 hemoglobin 14 platelet count 516. LFTs normal.TIBC 4 5 iron 86 iron sat 21 serum ferritin 30  1-19 8.2> 12.5< 458 iron 52, ferritin 16  2020 CMP with sodium of 140 potassium 4 BUN of 18 creatinine 0.61 calcium 10.3 glucose of 210 rest of LFTs within normal limits ferritin 20.9 TIBC 397.  CBC with WBC of 7.6 hemoglobin 12.7 hematocrit 38.4 MCV 95.2 platelets of 104    9540 ferritin of 205 K47 of 917 folic acid of more than 20 CMP with sodium of 138 potassium of 4 BUN of 15 creatinine 0.58 LFTs within normal limits CBC with WBC of 8 hemoglobin of 15.2 hematocrit 45.7 MCV of 96 and platelets of 887     6/18/20: CBC with wbc 5.7, hb 12.4, hct 36.8, mcv 92, platelets 271  G27 233, Folate >20 ferritin 61  Calcium 10.3    9/21/2020 CBC with WBC 5.8 hemoglobin of 11.2 hematocrit of 32.6 MCV of 88 platelets of 190 calcium of 10.4 iron saturation 1 in the 96% ferritin of 13 December 2020 colonoscopy normal.      PMH: DM, HLP, GERD, colonoscopy in 2017, EGD 2017(apparently normal per Pt)    PSH: Tonsillectomy, D&C, cholecystectomy, bilateral cataract surgery in 2016, laser surgery bilateral eyes in 2017    Allergies:Celebrex: Stomach pain, Keflex sores in the mouth. bactrim stomach pain, prednisone BG elevation    FH:No family h/o leukema, lymphoma, anemia or any other blood dyscrasias    SH:Lives alone. Has two children. No tob, etoh or any other illicit drug abuse    Interval History  3/12/2021: Arrived with her  to the clinic today. Energy levels are slightly better. No PICAsx. No chest pain, increased sob. No bleeding or any rash. Halitosis resolved No constipation or any diarrhea. No weight loss. No abdominal pain,nausea or any emesis. No  palpitations or any dizziness . Has been taking Oral iron bid since jan 2021.      Review of Systems   Per interval history; otherwise 10 point ROS is negative              Vital Signs:  BP (!) 126/59 (Site: Right Upper Arm, Position: Sitting, Cuff Size: Medium Adult)   Pulse 88   Temp 98.7 °F (37.1 °C) (Oral)   Resp 16   Ht 5' 1\" (1.549 m)   Wt 144 lb (65.3 kg)   SpO2 93%   BMI 27.21 kg/m²     Physical Exam:  CONSTITUTIONAL: awake, alert, well built   EYES: No palor   ENT: ATNC   NECK: No JVD   HEMATOLOGIC/LYMPHATIC: no cervical, supraclavicular or axillary lymphadenopathy   LUNGS: ctab   CARDIOVASCULAR: s1s2 , rrr, no murmurs   ABDOMEN: soft ntnd bs pos  NEUROLOGIC: GI   SKIN: echymosis   EXTREMITIES: No LE edema bilaterally      Labs:    Hematology:  Lab Results   Component Value Date    WBC 6.9 02/26/2021    RBC 4.95 02/26/2021    HGB 14.1 02/26/2021    HCT 43.4 02/26/2021    MCV 88 02/26/2021    MCH 28.5 02/26/2021    MCHC 32.5 02/26/2021    RDW 15.2 02/26/2021     02/26/2021    SEGSPCT 63 02/26/2021    EOSRELPCT 5 02/26/2021    BASOPCT 1 02/26/2021    LYMPHOPCT 22 02/26/2021    MONOPCT 9 02/26/2021    EOSABS 0.3 02/26/2021    BASOSABS 0.1 02/26/2021    LYMPHSABS 1.5 02/26/2021    MONOSABS 0.6 02/26/2021     No results found for: ESR    Chemistry:  Lab Results   Component Value Date     02/26/2021    K 5.0 02/26/2021     02/26/2021    CO2 27 02/26/2021    BUN 13 02/26/2021    CREATININE 0.70 02/26/2021    GLUCOSE 93 02/26/2021    CALCIUM 10.2 02/26/2021    PROT 6.8 02/26/2021    LABALBU 4.5 02/26/2021    BILITOT 0.2 02/26/2021    ALKPHOS 89 02/26/2021    AST 20 02/26/2021    ALT 22 02/26/2021    LABGLOM 87 02/26/2021    GFRAA 101 02/26/2021    AGRATIO 2.0 02/26/2021    GLOB 2.3 02/26/2021     No results found for: MMA, LDH, HOMOCYSTEINE  No components found for: LD  No results found for: TSHHS, T4FREE, FT3    Immunology:  Lab Results   Component Value Date    PROT 6.8 02/26/2021     No results found for: Beverlyn Market, KLFLCR  No results found for: B2M    Coagulation Panel:  No results found for: PROTIME, INR, APTT, DDIMER    Anemia Panel:  Lab Results   Component Value Date    [id number] 577 02/26/2021    FOLATE >20.0 02/26/2021       Tumor Markers:  No results found for: , CEA, , LABCA2, PSA      Imaging: Reviewed     Pathology:Reviewed     Observations:  Performance Status: ECOG 0  Depression Status: No data recorded          Assessment & Plan:  Anemia; NC: iron panel and ferritin consistent with WILL. UA with no blood. No overt bleeding. Received parental iron in the past. GI w/u 2017 benign, repeat colonoscopy in December 2020 normal. No plans for EGD or VCE.  Note hb improved to 14, but ferritin still 24. Wishes to continue oral iron bid  And wishes to be monitored for now. Thrombocytosis:Most probably sec to WILL. Counts normalized    Continue other medical care. Discussed above findings and plan with her and she verbalized understanding. Answered all her questions. Discussed healthy lifestyle including regular exercise and healthy diet. Also discussed importance of being up-to-date with age-appropriate screening tools. Mammogram November 2020 normal. No more pap smears but pelvic exam every two years, last exam was in 2019. Recommend follow-up with primary care physician and other specialist.    Please do not hesitate to contact us if you have any questions. Return to clinic July 2021  or earlier if new symptoms. I have recommended that the patient follow CDC guidelines for prevention of COVID-19 infection.     [id number] Jen [address]           Electronically signed by David Sepulveda MD on 3/12/2021 at 12:01 PM

## 2021-03-12 NOTE — PROGRESS NOTES
MA Rooming Questions  Patient: Maria Isabel Ledezma  MRN: [id number]    Date: 3/12/2021        1. Do you have any new issues?   no         2. Do you need any refills on medications?    no    3. Have you had any imaging done since your last visit?   no    4. Have you been hospitalized or seen in the emergency room since your last visit here?   no    5. Did the patient have a depression screening completed today?  No    No data recorded     PHQ-9 Given to (if applicable):               PHQ-9 Score (if applicable):                     [] Positive     []  Negative              Does question #9 need addressed (if applicable)                     [] Yes    []  No               Lux Kwok MA

## 2021-05-03 RX ORDER — FERROUS GLUCONATE 324(37.5)
TABLET ORAL
Qty: 60 TABLET | Refills: 3 | Status: SHIPPED | OUTPATIENT
Start: 2021-05-03 | End: 2021-09-20

## 2021-05-17 ENCOUNTER — TELEPHONE (OUTPATIENT)
Dept: ONCOLOGY | Age: 71
End: 2021-05-17

## 2021-05-17 NOTE — TELEPHONE ENCOUNTER
Spoke with pt regarding labs and results of EGD. Hgb 13.3 but ferritin is 24. Pt is taking oral iron and will take with orange juice. She has repeat labs in July but will call if she starts to feel bad prior to that.

## 2021-07-29 ENCOUNTER — TELEPHONE (OUTPATIENT)
Dept: ONCOLOGY | Age: 71
End: 2021-07-29

## 2021-07-29 DIAGNOSIS — D50.0 IRON DEFICIENCY ANEMIA SECONDARY TO BLOOD LOSS (CHRONIC): Primary | ICD-10-CM

## 2021-08-02 RX ORDER — SODIUM CHLORIDE 0.9 % (FLUSH) 0.9 %
5-40 SYRINGE (ML) INJECTION PRN
Status: CANCELLED | OUTPATIENT
Start: 2021-08-02

## 2021-08-02 RX ORDER — SODIUM CHLORIDE 9 MG/ML
25 INJECTION, SOLUTION INTRAVENOUS PRN
Status: CANCELLED | OUTPATIENT
Start: 2021-08-02

## 2021-08-02 RX ORDER — EPINEPHRINE 1 MG/ML
0.3 INJECTION, SOLUTION, CONCENTRATE INTRAVENOUS PRN
Status: CANCELLED | OUTPATIENT
Start: 2021-08-02

## 2021-08-02 RX ORDER — SODIUM CHLORIDE 9 MG/ML
INJECTION, SOLUTION INTRAVENOUS CONTINUOUS
Status: CANCELLED | OUTPATIENT
Start: 2021-08-02

## 2021-08-02 RX ORDER — HEPARIN SODIUM (PORCINE) LOCK FLUSH IV SOLN 100 UNIT/ML 100 UNIT/ML
500 SOLUTION INTRAVENOUS PRN
Status: CANCELLED | OUTPATIENT
Start: 2021-08-02

## 2021-08-02 RX ORDER — METHYLPREDNISOLONE SODIUM SUCCINATE 125 MG/2ML
125 INJECTION, POWDER, LYOPHILIZED, FOR SOLUTION INTRAMUSCULAR; INTRAVENOUS ONCE
Status: CANCELLED | OUTPATIENT
Start: 2021-08-02 | End: 2021-08-02

## 2021-08-02 RX ORDER — DIPHENHYDRAMINE HYDROCHLORIDE 50 MG/ML
50 INJECTION INTRAMUSCULAR; INTRAVENOUS ONCE
Status: CANCELLED | OUTPATIENT
Start: 2021-08-02 | End: 2021-08-02

## 2021-08-05 ENCOUNTER — TELEPHONE (OUTPATIENT)
Dept: ONCOLOGY | Age: 71
End: 2021-08-05

## 2021-08-05 NOTE — TELEPHONE ENCOUNTER
Patient called asking why she needs this iron infusion when her lab results were all normal, and that her PCP changed her oral iron to Vitron-C, reviewed w/DR Grajeda and she cancelled order, advised patient she doesn't need this infusion and the order has been cancelled, patient states understanding

## 2021-09-20 ENCOUNTER — OFFICE VISIT (OUTPATIENT)
Dept: ONCOLOGY | Age: 71
End: 2021-09-20
Payer: MEDICARE

## 2021-09-20 ENCOUNTER — HOSPITAL ENCOUNTER (OUTPATIENT)
Dept: INFUSION THERAPY | Age: 71
Discharge: HOME | End: 2021-09-20
Payer: MEDICARE

## 2021-09-20 VITALS
RESPIRATION RATE: 16 BRPM | OXYGEN SATURATION: 97 % | HEART RATE: 85 BPM | WEIGHT: 150.2 LBS | TEMPERATURE: 97 F | HEIGHT: 62 IN | BODY MASS INDEX: 27.64 KG/M2 | SYSTOLIC BLOOD PRESSURE: 126 MMHG | DIASTOLIC BLOOD PRESSURE: 59 MMHG

## 2021-09-20 DIAGNOSIS — D50.0 IRON DEFICIENCY ANEMIA SECONDARY TO BLOOD LOSS (CHRONIC): ICD-10-CM

## 2021-09-20 DIAGNOSIS — K90.9 INTESTINAL MALABSORPTION, UNSPECIFIED TYPE: Primary | ICD-10-CM

## 2021-09-20 PROCEDURE — 99213 OFFICE O/P EST LOW 20 MIN: CPT | Performed by: INTERNAL MEDICINE

## 2021-09-20 PROCEDURE — 99211 OFF/OP EST MAY X REQ PHY/QHP: CPT

## 2021-09-20 RX ORDER — IRON,CARBONYL/ASCORBIC ACID 65MG-125MG
TABLET, DELAYED RELEASE (ENTERIC COATED) ORAL
COMMUNITY

## 2021-09-20 ASSESSMENT — PATIENT HEALTH QUESTIONNAIRE - PHQ9
SUM OF ALL RESPONSES TO PHQ9 QUESTIONS 1 & 2: 2
2. FEELING DOWN, DEPRESSED OR HOPELESS: 1
SUM OF ALL RESPONSES TO PHQ QUESTIONS 1-9: 2
1. LITTLE INTEREST OR PLEASURE IN DOING THINGS: 1

## 2021-09-20 NOTE — PROGRESS NOTES
MA Rooming Questions  Patient: Prince Hammer  MRN: [id number]    Date: 9/20/2021        1. Do you have any new issues?   no         2. Do you need any refills on medications?    no    3. Have you had any imaging done since your last visit? yes - Bone scan    4. Have you been hospitalized or seen in the emergency room since your last visit here?   no    5. Did the patient have a depression screening completed today?  Yes    PHQ-9 Total Score: 2 (9/20/2021  1:49 PM)       PHQ-9 Given to (if applicable):               PHQ-9 Score (if applicable):                     [] Positive     []  Negative              Does question #9 need addressed (if applicable)                     [] Yes    []  No               Karina Li CMA

## 2021-09-20 NOTE — PROGRESS NOTES
Patient Name: [place]  Patient : 1950  Patient MRN: [id number]     Primary Oncologist: Brian Gonzalez MD  Referring Physician: Arielle Coates       Date of Service: 2021      Chief Complaint:   Chief Complaint   Patient presents with    Discuss Labs        Active Problem list  1. Intestinal malabsorption, unspecified type    2. Iron deficiency anemia secondary to blood loss (chronic)           HPI:        66-year-old female here for evaluation on 2017 white count 6.2 hemoglobin 9.1 MCV 71 platelets 096 cyst of the differential looks good. Serum iron 46, ferritin 5 B12 704. On 2017 white count 6 hemoglobin 8.6 MCV 73 platelets 535. Her serum iron at that time was 32 B12 663 ferritin 6. She had iron studies also in the beginning of 2017 which were similar to the ones above. She had TSH that was 1.4. Looking back on her chart in 2016 hemoglobin was normal MCV was normal platelets was normal.Looking through the chart by from Dr. Sandrita Zarco Patient is on iron 180 daily. She has been having some fatigue some dark stools. She had a EGD 2017 which showed moderate gastritis and GERD. And she also had a colonoscopy in 2017, which is normal, Dr. Job Dumont. she has been on iron since 2017. No PICA sx. Increasing leg cramps at night. Has some constipation on iron. Taking iron tablets w/o difficulty. 2-18 WBC 7, hgb 12. 2. platlets 353 MCV 77, serum iron 242, ferritin 16,   7/10/2018 white count 5 hemoglobin 14 platelet count 525. LFTs normal.TIBC 4 5 iron 86 iron sat 21 serum ferritin 30  1-19 8.2> 12.5< 458 iron 52, ferritin 16  2020 CMP with sodium of 140 potassium 4 BUN of 18 creatinine 0.61 calcium 10.3 glucose of 210 rest of LFTs within normal limits ferritin 20.9 TIBC 397.  CBC with WBC of 7.6 hemoglobin 12.7 hematocrit 38.4 MCV 95.2 platelets of 294     ferritin of 114 F35 of 049 folic acid of more than 20 CMP with sodium of 138 bilaterally      Labs:    Hematology:  Lab Results   Component Value Date    WBC 8.5 09/13/2021    RBC 5.05 09/13/2021    HGB 14.9 09/13/2021    HCT 44.6 09/13/2021    MCV 88 09/13/2021    MCH 29.5 09/13/2021    MCHC 33.4 09/13/2021    RDW 14.5 09/13/2021     09/13/2021    SEGSPCT 76 09/13/2021    EOSRELPCT 2 09/13/2021    BASOPCT 1 09/13/2021    LYMPHOPCT 13 09/13/2021    MONOPCT 8 09/13/2021    EOSABS 0.2 09/13/2021    BASOSABS 0.1 09/13/2021    LYMPHSABS 1.1 09/13/2021    MONOSABS 0.7 09/13/2021     No results found for: ESR    Chemistry:  Lab Results   Component Value Date     09/13/2021    K 5.0 09/13/2021     09/13/2021    CO2 26 09/13/2021    BUN 15 09/13/2021    CREATININE 0.61 09/13/2021    GLUCOSE 174 (H) 09/13/2021    CALCIUM 10.2 09/13/2021    PROT 6.8 09/13/2021    LABALBU 4.4 09/13/2021    BILITOT 0.3 09/13/2021    ALKPHOS 79 09/13/2021    AST 15 09/13/2021    ALT 19 09/13/2021    LABGLOM 92 09/13/2021    GFRAA 105 09/13/2021    AGRATIO 1.8 09/13/2021    GLOB 2.4 09/13/2021     No results found for: MMA, LDH, HOMOCYSTEINE  No components found for: LD  No results found for: TSHHS, T4FREE, FT3    Immunology:  Lab Results   Component Value Date    PROT 6.8 09/13/2021     No results found for: Jennifer Quezada, KLFLCR  No results found for: B2M    Coagulation Panel:  No results found for: PROTIME, INR, APTT, DDIMER    Anemia Panel:  Lab Results   Component Value Date    NTZISUHJ26 577 02/26/2021    FOLATE >20.0 02/26/2021       Tumor Markers:  No results found for: , CEA, , LABCA2, PSA      Imaging: Reviewed     Pathology:Reviewed     Observations:  Performance Status: ECOG 0  Depression Status: PHQ-9 Total Score: 2 (9/20/2021  1:49 PM)          Assessment & Plan:  Anemia; NC: iron panel and ferritin consistent with WILL. UA with no blood. No overt bleeding.  Received parental iron in the past. GI w/u 2017 benign, repeat colonoscopy in December 2020 normal. No plans for EGD

## 2021-09-28 ENCOUNTER — TELEPHONE (OUTPATIENT)
Dept: ONCOLOGY | Age: 71
End: 2021-09-28

## 2021-09-28 NOTE — TELEPHONE ENCOUNTER
Patient called to discuss a refund she received on 9/20/21 and that she could now not balance her checkbook. I advised it was a refund coincidentally on the same day of her last appointment but was a refund from a dos in March and not her 9/20/21 OV. She then wanted to discuss her last 2 appointments and her wait-times, to which she was transferred to our .

## 2021-12-22 ENCOUNTER — OFFICE VISIT (OUTPATIENT)
Dept: ONCOLOGY | Age: 71
End: 2021-12-22
Payer: MEDICARE

## 2021-12-22 DIAGNOSIS — K90.9 INTESTINAL MALABSORPTION, UNSPECIFIED TYPE: ICD-10-CM

## 2021-12-22 DIAGNOSIS — D50.0 IRON DEFICIENCY ANEMIA SECONDARY TO BLOOD LOSS (CHRONIC): Primary | ICD-10-CM

## 2021-12-22 DIAGNOSIS — D75.9 DISEASE OF BLOOD AND BLOOD-FORMING ORGANS, UNSPECIFIED: ICD-10-CM

## 2021-12-22 PROCEDURE — 99421 OL DIG E/M SVC 5-10 MIN: CPT | Performed by: INTERNAL MEDICINE

## 2021-12-22 ASSESSMENT — PATIENT HEALTH QUESTIONNAIRE - PHQ9
SUM OF ALL RESPONSES TO PHQ QUESTIONS 1-9: 0
SUM OF ALL RESPONSES TO PHQ QUESTIONS 1-9: 0
SUM OF ALL RESPONSES TO PHQ9 QUESTIONS 1 & 2: 0
SUM OF ALL RESPONSES TO PHQ QUESTIONS 1-9: 0
2. FEELING DOWN, DEPRESSED OR HOPELESS: 0
1. LITTLE INTEREST OR PLEASURE IN DOING THINGS: 0

## 2021-12-22 NOTE — PROGRESS NOTES
MA Rooming Questions  Patient: Harpreet Enamorado  MRN: [id number]    Date: 12/22/2021        1. Do you have any new issues?   no         2. Do you need any refills on medications?    no    3. Have you had any imaging done since your last visit?   no    4. Have you been hospitalized or seen in the emergency room since your last visit here?   no    5. Did the patient have a depression screening completed today?  Yes    PHQ-9 Total Score: 0 (12/22/2021  3:46 PM)       PHQ-9 Given to (if applicable):               PHQ-9 Score (if applicable):                     [] Positive     []  Negative              Does question #9 need addressed (if applicable)                     [] Yes    []  No               Princess Dary CMA

## 2021-12-22 NOTE — PROGRESS NOTES
Patient Name: [place]  Patient : 1950  Patient MRN: [id number]     Primary Oncologist: Tadeo Rico MD  Referring Physician: Rome Proctor       Date of Service: 2021      Chief Complaint:   Chief Complaint   Patient presents with    Other     telehealth        Active Problem list  1. Iron deficiency anemia secondary to blood loss (chronic)    2. Intestinal malabsorption, unspecified type    3. Disease of blood and blood-forming organs, unspecified           HPI:        79-year-old female here for evaluation on 2017 white count 6.2 hemoglobin 9.1 MCV 71 platelets 035 cyst of the differential looks good. Serum iron 46, ferritin 5 B12 704. On 2017 white count 6 hemoglobin 8.6 MCV 73 platelets 356. Her serum iron at that time was 32 B12 663 ferritin 6. She had iron studies also in the beginning of 2017 which were similar to the ones above. She had TSH that was 1.4. Looking back on her chart in 2016 hemoglobin was normal MCV was normal platelets was normal.Looking through the chart by from Dr. Ralf Chavez Patient is on iron 180 daily. She has been having some fatigue some dark stools. She had a EGD 2017 which showed moderate gastritis and GERD. And she also had a colonoscopy in 2017, which is normal, Dr. Maddi Mccormack. she has been on iron since 2017. No PICA sx. Increasing leg cramps at night. Has some constipation on iron. Taking iron tablets w/o difficulty. 2-18 WBC 7, hgb 12. 2. platlets 353 MCV 77, serum iron 242, ferritin 16,   7/10/2018 white count 5 hemoglobin 14 platelet count 167. LFTs normal.TIBC 4 5 iron 86 iron sat 21 serum ferritin 30  1-19 8.2> 12.5< 458 iron 52, ferritin 16  2020 CMP with sodium of 140 potassium 4 BUN of 18 creatinine 0.61 calcium 10.3 glucose of 210 rest of LFTs within normal limits ferritin 20.9 TIBC 397.  CBC with WBC of 7.6 hemoglobin 12.7 hematocrit 38.4 MCV 95.2 platelets of 057    2/3/4958 ferritin of 264 I65 of 156 folic acid of more than 20 CMP with sodium of 138 potassium of 4 BUN of 15 creatinine 0.58 LFTs within normal limits CBC with WBC of 8 hemoglobin of 15.2 hematocrit 45.7 MCV of 96 and platelets of 104     6/18/20: CBC with wbc 5.7, hb 12.4, hct 36.8, mcv 92, platelets 399  B12 546, Folate >20 ferritin 61  Calcium 10.3    9/21/2020 CBC with WBC 5.8 hemoglobin of 11.2 hematocrit of 32.6 MCV of 88 platelets of 117 calcium of 10.4 iron saturation 1 in the 96% ferritin of 13 December 2020 colonoscopy normal.      PMH: DM, HLP, GERD, colonoscopy in 2017, EGD 2017(apparently normal per Pt)    PSH: Tonsillectomy, D&C, cholecystectomy, bilateral cataract surgery in 2016, laser surgery bilateral eyes in 2017    Allergies:Celebrex: Stomach pain, Keflex sores in the mouth. bactrim stomach pain, prednisone BG elevation    FH:No family h/o leukema, lymphoma, anemia or any other blood dyscrasias    SH:Lives alone. Has two children. No tob, etoh or any other illicit drug abuse    Interval History  12/22/2021: Televisit  Was sick with pna for the past 1 month. Starting to feel little better. Breathing fine. No fever. No bleeding. Fatigue at times. Is on oral iron BID. No PICA sx. No abdominal pain. No Gu sx. No constipation. Review of Systems   Per interval history; otherwise 10 point ROS is negative              Vital Signs: There were no vitals taken for this visit.     Physical Exam:  Televisit      Labs:    Hematology:  Lab Results   Component Value Date    WBC 7.0 12/07/2021    RBC 5.06 12/07/2021    HGB 14.4 12/07/2021    HCT 44.5 12/07/2021    MCV 88 12/07/2021    MCH 28.5 12/07/2021    MCHC 32.4 12/07/2021    RDW 12.4 12/07/2021     12/07/2021    SEGSPCT 72 12/07/2021    EOSRELPCT 2 12/07/2021    BASOPCT 1 12/07/2021    LYMPHOPCT 19 12/07/2021    MONOPCT 6 12/07/2021    EOSABS 0.1 12/07/2021    BASOSABS 0.1 12/07/2021    LYMPHSABS 1.4 12/07/2021    MONOSABS 0.4 12/07/2021     No results found diet. Also discussed importance of being up-to-date with age-appropriate screening tools. Last colonoscopy dec 2021. Mammogram November 2020 normal. No more pap smears but pelvic exam every two years, last exam was in 2019. Recommend follow-up with primary care physician and other specialist.    Please do not hesitate to contact us if you have any questions. Return to clinic PRN     I have recommended that the patient follow CDC guidelines for prevention of COVID-19 infection. Received COVID vaccine.      [id number]